# Patient Record
(demographics unavailable — no encounter records)

---

## 2024-10-21 NOTE — ASSESSMENT
[FreeTextEntry1] : 34 yo M with right scalp lesion c/w nevus sebaceous indicated for office excision under local anesthesia (wide awake)  Recommend excision of right parietal scalp skin lesion    -Benefits, risks and alternatives of the procedure were discussed. The risks include but not limited to bleeding, infection, poor wound healing and scarring, possible keloid, scar alopecia, and need for re-operation. Location of scar and expected post-surgical outcomes were discussed. The patient understands the risks and would like to proceed with the office surgery. -Informed consent obtained -All questions were answered -Tylenol on DOS -Reviewed post-op lifting/exercise restrictions -Will schedule at earliest convenience

## 2024-10-21 NOTE — HISTORY OF PRESENT ILLNESS
[FreeTextEntry1] : 32 yo M with PMHx of GERD who presents today for evaluation of right scalp skin lesion present for many years. Denies any pain, drainage or pruritus but c/o irritation during haircuts. Denies any family h/o skin cancer. Pt also endorses similar-looking left parietal scalp lesion.   He reports the right parietal skin lesion is frequently cut during his suarez/hair cuts.  He would like to discuss options for removal.  Occupation - [employer]

## 2024-10-21 NOTE — PHYSICAL EXAM
[de-identified] : well-developed male, NAD [de-identified] : NC/AT [de-identified] : supple [de-identified] : nonlabored breathing  [de-identified] : MORGANR [de-identified] : soft, nontender  [de-identified] : Scalp - right retroauricular scalp with 1.4 x 1 cm raised slightly tan flesh pigmented skin lesion, nontender, no malignant characteristics

## 2024-12-02 NOTE — PHYSICAL EXAM
[de-identified] : well-developed male, NAD [de-identified] : NC/AT [de-identified] : supple [de-identified] : nonlabored breathing  [de-identified] : MORGANR [de-identified] : soft, nontender  [de-identified] : Scalp - right retroauricular scalp with 1.4 x 1 cm raised slightly tan flesh pigmented skin lesion, nontender, no malignant characteristics

## 2024-12-02 NOTE — PROCEDURE
[FreeTextEntry1] : right parietal scalp skin lesion  [FreeTextEntry2] : excision of right parietal scalp skin lesion  [FreeTextEntry6] : Benefits, risks and alternatives of the procedure were discussed. The risks include but not limited to bleeding, infection, poor wound healing and scarring, possible keloid, and need for re-operation. Location of scar and expected post-surgical outcomes were discussed. The patient understands the risks and would like to proceed with the office surgery.  The skin lesion was marked and infiltrated with local anesthesia to effect.  The excision site was prepared and draped in sterile fashion. The skin lesion was excised with the indicated margins in the usual fashion and sent to pathology for review.  Surgical wounds were made hemostatic and repaired as follows:  Site: Skin lesion width (with margins): 1.4 cm x 3.4 cm Closure complexity and length:local tissue advancement with undermining 2 cm x 3.5 cm [FreeTextEntry7] : right scalp skin

## 2024-12-02 NOTE — ASSESSMENT
[FreeTextEntry1] : 32 yo M with right scalp lesion c/w nevus sebaceous indicated for office excision under local anesthesia (wide awake)  Recommend excision of right parietal scalp skin lesion    -tylenol PRN pain -No heavy lifting x 1 weeks -may shampoo in 24-48 hours -follow up in 1 week for path and suture removal

## 2024-12-02 NOTE — HISTORY OF PRESENT ILLNESS
[FreeTextEntry1] : 32 yo M with PMHx of GERD who presents today for evaluation of right scalp skin lesion present for many years. Denies any pain, drainage or pruritus but c/o irritation during haircuts. Denies any family h/o skin cancer. Pt also endorses similar-looking left parietal scalp lesion.   He reports the right parietal skin lesion is frequently cut during his suarez/hair cuts.  He would like to discuss options for removal.  Occupation - [employer]   Interval hx (12/2/24):  Here for right scalp skin lesion excision.  No complaints.

## 2024-12-09 NOTE — HISTORY OF PRESENT ILLNESS
[FreeTextEntry1] : 34 yo M with PMHx of GERD who presents today for evaluation of right scalp skin lesion present for many years. Denies any pain, drainage or pruritus but c/o irritation during haircuts. Denies any family h/o skin cancer. Pt also endorses similar-looking left parietal scalp lesion.   He reports the right parietal skin lesion is frequently cut during his suarez/hair cuts.  He would like to discuss options for removal.  Occupation - [employer]   Interval hx (12/2/24):  Here for right scalp skin lesion excision.  No complaints.  Interval hx (12/9/24): Pt presents today POD#7 s/p office excision of right scalp skin lesion.  Doing well with no significant pain, f/c or bleeding. Applying Neosporin.

## 2024-12-09 NOTE — ASSESSMENT
[FreeTextEntry1] : 32 yo M with right scalp lesion c/w nevus sebaceous indicated for office excision under local anesthesia (wide awake)  Now POD#7 s/p office excision of right parietal scalp skin lesion. Doing well.    -Suture tails removed -Daily Bacitracin x 2 days then Aquaphor -May shower, no submerging -No heavy lifting  -Pathology discussed - melanocytic nevus, completely excised, report given to pt -Post-op instructions reviewed and all his questions were answered -Follow up in 1 month with Dr. Leonard.

## 2024-12-09 NOTE — DATA REVIEWED
[FreeTextEntry1] : Patient:     LINDA FOWLER   Accession:                             [id number]  Collected Date/Time:                   12/2/2024 08:48 EST Received Date/Time:                    12/3/2024 08:49 EST  Surgical Pathology Report - Auth (Verified)  Specimen(s) Submitted Right parietal scalp skin lesion  Final Diagnosis Skin lesion, right parietal scalp, excision: - Intradermal melanocytic nevus with congenital features, completely excised.   Note:  The lesion is 0.1 cm away from the closest peripheral short axis margin. Verified by: Mitra Yo M.D. (Electronic Signature) Reported on: 12/08/24 16:17 EST, [facility], [address] [place] [address] Phone: [phone]   Fax: [phone]

## 2024-12-09 NOTE — PHYSICAL EXAM
[de-identified] : well-developed male, NAD [de-identified] : NC/AT [de-identified] : nonlabored breathing  [de-identified] : MORGANR [de-identified] : soft, nontender  [de-identified] : Scalp - right retroauricular scalp incision healing well, c/d/i, no erythema or drainage,

## 2025-01-06 NOTE — ASSESSMENT
[FreeTextEntry1] : 34 yo M with right scalp lesion c/w nevus sebaceous indicated for office excision under local anesthesia (wide awake)  5 weeks s/p office excision of right parietal scalp skin lesion. Doing well.    - bacitracin BID x  24 hrs, then daily aquaphor to incision - may resume hair cuts later this week - Pathology discussed - melanocytic nevus, completely excised; report given to pt at last visit - all his questions were answered - Follow up PRN

## 2025-01-06 NOTE — HISTORY OF PRESENT ILLNESS
[FreeTextEntry1] : 34 yo M with PMHx of GERD who presents today for evaluation of right scalp skin lesion present for many years. Denies any pain, drainage or pruritus but c/o irritation during haircuts. Denies any family h/o skin cancer. Pt also endorses similar-looking left parietal scalp lesion.   He reports the right parietal skin lesion is frequently cut during his suarez/hair cuts.  He would like to discuss options for removal.  Occupation - [employer]   Interval hx (12/2/24):  Here for right scalp skin lesion excision.  No complaints.  Interval hx (12/9/24): Pt presents today POD#7 s/p office excision of right scalp skin lesion.  Doing well with no significant pain, f/c or bleeding. Applying Neosporin.   Interval hx (1/6/25):  5 weeks s/p office excision of right scalp skin lesion.  Pt has not been applying aquaphor daily to incision.  Denies drainage.

## 2025-01-06 NOTE — PHYSICAL EXAM
[de-identified] : well-developed male, NAD [de-identified] : NC/AT [de-identified] : nonlabored breathing  [de-identified] : MORGANR [de-identified] : soft, nontender  [de-identified] : Scalp - right retroauricular scalp incision healing well, three punctate dried serous scabs, no erythema

## 2025-01-06 NOTE — DATA REVIEWED
[FreeTextEntry1] : Patient:     LINDA FOWLER   Accession:                             [id number]  Collected Date/Time:                   12/2/2024 08:48 EST Received Date/Time:                    12/3/2024 08:49 EST  Surgical Pathology Report - Auth (Verified)  Specimen(s) Submitted Right parietal scalp skin lesion  Final Diagnosis Skin lesion, right parietal scalp, excision: - Intradermal melanocytic nevus with congenital features, completely excised.   Note:  The lesion is 0.1 cm away from the closest peripheral short axis margin. Verified by: Mitra Yo M.D. (Electronic Signature) Reported on: 12/08/24 16:17 EST, [facility], [address] [place] [address] Phone: [phone]   Fax: [phone]